# Patient Record
Sex: MALE | Race: BLACK OR AFRICAN AMERICAN | NOT HISPANIC OR LATINO | URBAN - METROPOLITAN AREA
[De-identification: names, ages, dates, MRNs, and addresses within clinical notes are randomized per-mention and may not be internally consistent; named-entity substitution may affect disease eponyms.]

---

## 2017-08-19 ENCOUNTER — EMERGENCY (EMERGENCY)
Facility: HOSPITAL | Age: 22
LOS: 0 days | Discharge: HOME | End: 2017-08-19
Attending: EMERGENCY MEDICINE
Payer: COMMERCIAL

## 2017-08-19 VITALS
HEIGHT: 68 IN | RESPIRATION RATE: 20 BRPM | DIASTOLIC BLOOD PRESSURE: 85 MMHG | WEIGHT: 111.99 LBS | OXYGEN SATURATION: 99 % | SYSTOLIC BLOOD PRESSURE: 127 MMHG | TEMPERATURE: 98 F | HEART RATE: 73 BPM

## 2017-08-19 DIAGNOSIS — R07.89 OTHER CHEST PAIN: ICD-10-CM

## 2017-08-19 DIAGNOSIS — R07.81 PLEURODYNIA: ICD-10-CM

## 2017-08-19 PROCEDURE — 71010: CPT | Mod: 26

## 2017-08-19 PROCEDURE — 99284 EMERGENCY DEPT VISIT MOD MDM: CPT

## 2017-08-19 RX ORDER — ACETAMINOPHEN 500 MG
975 TABLET ORAL ONCE
Qty: 0 | Refills: 0 | Status: COMPLETED | OUTPATIENT
Start: 2017-08-19 | End: 2017-08-19

## 2017-08-19 RX ADMIN — Medication 975 MILLIGRAM(S): at 14:47

## 2017-08-19 RX ADMIN — Medication 975 MILLIGRAM(S): at 15:16

## 2017-08-19 NOTE — ED PROVIDER NOTE - OBJECTIVE STATEMENT
20 yo M with R sided pleuritis chest pain, worse with deep breath, present for months.  Pt. says he's seen doctors in the past, and everything has been fine, including blood tests and imaging.  Pt. has no other complaints.  Pain comes and goes, but he noticed it today on waking in the morning.  Pt. has no other complaints.  He denies trauma/injury and heart problems.   ROS: negative for fever, cough, headache, shortness of breath, abd pain, nausea, vomiting, diarrhea, rash, paresthesia, and weakness.   PMH: negative; Meds: Denies; SH: Denies smoking/drinking/drug use

## 2017-08-19 NOTE — ED ADULT TRIAGE NOTE - CHIEF COMPLAINT QUOTE
patient c/o of R side chest pain , on and off for a while , patient stated " I have this pain on and off for a while but this time is not going away from yesterday " , denied N/V , any dizziness , denied headache , as per father patient is very anxious

## 2017-08-19 NOTE — ED ADULT NURSE NOTE - OBJECTIVE STATEMENT
received ft c/o r upper chest pain since yesterday denies recent injury or known trauma denies radiation across chest or to arms/jaw areas denies back pain , states pain worse upon inspiration denies recent cough or uri symptoms lungs clear b/l

## 2017-08-19 NOTE — ED PROVIDER NOTE - PHYSICAL EXAMINATION
Vitals: WNL  Gen: AAOx3, NAD, sitting comfortably in stretcher  Head: ncat, perrla, eomi b/l  Neck: supple, no lymphadenopathy, no midline deviation  Heart: rrr, no m/r/g  Lungs: CTA b/l, no rales/ronchi/wheezes  Abd: soft, nontender, non-distended, no rebound or guarding  Ext: no clubbing/cyanosis/edema  Neuro: sensation and muscle strength intact b/l, steady gait

## 2017-08-19 NOTE — ED PROVIDER NOTE - PROGRESS NOTE DETAILS
Results reported to patient--grossly benign  Pt. reports feeling better after meds  pt. agrees to f/u with primary care outpt.  pt. understands to return to ED if symptoms worsen; will d/c

## 2017-08-20 PROCEDURE — 93010 ELECTROCARDIOGRAM REPORT: CPT
